# Patient Record
Sex: MALE | Race: WHITE | NOT HISPANIC OR LATINO | Employment: UNEMPLOYED | URBAN - METROPOLITAN AREA
[De-identification: names, ages, dates, MRNs, and addresses within clinical notes are randomized per-mention and may not be internally consistent; named-entity substitution may affect disease eponyms.]

---

## 2021-10-20 ENCOUNTER — HOSPITAL ENCOUNTER (EMERGENCY)
Facility: HOSPITAL | Age: 56
Discharge: ANOTHER HOSPITAL | End: 2021-10-20
Attending: EMERGENCY MEDICINE | Admitting: EMERGENCY MEDICINE

## 2021-10-20 ENCOUNTER — APPOINTMENT (OUTPATIENT)
Dept: CT IMAGING | Facility: HOSPITAL | Age: 56
End: 2021-10-20

## 2021-10-20 VITALS
OXYGEN SATURATION: 95 % | RESPIRATION RATE: 20 BRPM | HEART RATE: 87 BPM | DIASTOLIC BLOOD PRESSURE: 71 MMHG | SYSTOLIC BLOOD PRESSURE: 115 MMHG | BODY MASS INDEX: 19.64 KG/M2 | TEMPERATURE: 99.8 F | HEIGHT: 72 IN | WEIGHT: 145 LBS

## 2021-10-20 DIAGNOSIS — D72.829 LEUKOCYTOSIS, UNSPECIFIED TYPE: ICD-10-CM

## 2021-10-20 DIAGNOSIS — K65.1 PELVIC ABSCESS IN MALE (HCC): Primary | ICD-10-CM

## 2021-10-20 LAB
ALBUMIN SERPL-MCNC: 3.6 G/DL (ref 3.5–5.2)
ALBUMIN/GLOB SERPL: 0.9 G/DL
ALP SERPL-CCNC: 77 U/L (ref 39–117)
ALT SERPL W P-5'-P-CCNC: <5 U/L (ref 1–41)
ANION GAP SERPL CALCULATED.3IONS-SCNC: 10.7 MMOL/L (ref 5–15)
AST SERPL-CCNC: 9 U/L (ref 1–40)
BACTERIA UR QL AUTO: ABNORMAL /HPF
BASOPHILS # BLD AUTO: 0.08 10*3/MM3 (ref 0–0.2)
BASOPHILS NFR BLD AUTO: 0.5 % (ref 0–1.5)
BILIRUB SERPL-MCNC: <0.2 MG/DL (ref 0–1.2)
BILIRUB UR QL STRIP: NEGATIVE
BUN SERPL-MCNC: 10 MG/DL (ref 6–20)
BUN/CREAT SERPL: 22.2 (ref 7–25)
CALCIUM SPEC-SCNC: 8.9 MG/DL (ref 8.6–10.5)
CHLORIDE SERPL-SCNC: 96 MMOL/L (ref 98–107)
CLARITY UR: CLEAR
CO2 SERPL-SCNC: 28.3 MMOL/L (ref 22–29)
COLOR UR: YELLOW
CREAT SERPL-MCNC: 0.45 MG/DL (ref 0.76–1.27)
D-LACTATE SERPL-SCNC: 1.3 MMOL/L (ref 0.5–2)
DEPRECATED RDW RBC AUTO: 41.4 FL (ref 37–54)
EOSINOPHIL # BLD AUTO: 0.69 10*3/MM3 (ref 0–0.4)
EOSINOPHIL NFR BLD AUTO: 4.3 % (ref 0.3–6.2)
ERYTHROCYTE [DISTWIDTH] IN BLOOD BY AUTOMATED COUNT: 14.3 % (ref 12.3–15.4)
GFR SERPL CREATININE-BSD FRML MDRD: >150 ML/MIN/1.73
GLOBULIN UR ELPH-MCNC: 4.2 GM/DL
GLUCOSE SERPL-MCNC: 92 MG/DL (ref 65–99)
GLUCOSE UR STRIP-MCNC: NEGATIVE MG/DL
HCT VFR BLD AUTO: 30.2 % (ref 37.5–51)
HGB BLD-MCNC: 10.2 G/DL (ref 13–17.7)
HGB UR QL STRIP.AUTO: ABNORMAL
HOLD SPECIMEN: NORMAL
HOLD SPECIMEN: NORMAL
HYALINE CASTS UR QL AUTO: ABNORMAL /LPF
IMM GRANULOCYTES # BLD AUTO: 0.06 10*3/MM3 (ref 0–0.05)
IMM GRANULOCYTES NFR BLD AUTO: 0.4 % (ref 0–0.5)
KETONES UR QL STRIP: NEGATIVE
LEUKOCYTE ESTERASE UR QL STRIP.AUTO: NEGATIVE
LIPASE SERPL-CCNC: 27 U/L (ref 13–60)
LYMPHOCYTES # BLD AUTO: 1.93 10*3/MM3 (ref 0.7–3.1)
LYMPHOCYTES NFR BLD AUTO: 12.1 % (ref 19.6–45.3)
MCH RBC QN AUTO: 27.6 PG (ref 26.6–33)
MCHC RBC AUTO-ENTMCNC: 33.8 G/DL (ref 31.5–35.7)
MCV RBC AUTO: 81.6 FL (ref 79–97)
MONOCYTES # BLD AUTO: 0.81 10*3/MM3 (ref 0.1–0.9)
MONOCYTES NFR BLD AUTO: 5.1 % (ref 5–12)
NEUTROPHILS NFR BLD AUTO: 12.36 10*3/MM3 (ref 1.7–7)
NEUTROPHILS NFR BLD AUTO: 77.6 % (ref 42.7–76)
NITRITE UR QL STRIP: NEGATIVE
NRBC BLD AUTO-RTO: 0.1 /100 WBC (ref 0–0.2)
PH UR STRIP.AUTO: 5.5 [PH] (ref 5–8)
PLATELET # BLD AUTO: 350 10*3/MM3 (ref 140–450)
PMV BLD AUTO: 9 FL (ref 6–12)
POTASSIUM SERPL-SCNC: 4.3 MMOL/L (ref 3.5–5.2)
PROCALCITONIN SERPL-MCNC: 0.08 NG/ML (ref 0–0.25)
PROT SERPL-MCNC: 7.8 G/DL (ref 6–8.5)
PROT UR QL STRIP: ABNORMAL
RBC # BLD AUTO: 3.7 10*6/MM3 (ref 4.14–5.8)
RBC # UR: ABNORMAL /HPF
REF LAB TEST METHOD: ABNORMAL
SODIUM SERPL-SCNC: 135 MMOL/L (ref 136–145)
SP GR UR STRIP: 1.02 (ref 1–1.03)
SQUAMOUS #/AREA URNS HPF: ABNORMAL /HPF
UROBILINOGEN UR QL STRIP: ABNORMAL
WBC # BLD AUTO: 15.93 10*3/MM3 (ref 3.4–10.8)
WBC UR QL AUTO: ABNORMAL /HPF
WHOLE BLOOD HOLD SPECIMEN: NORMAL
WHOLE BLOOD HOLD SPECIMEN: NORMAL

## 2021-10-20 PROCEDURE — 96361 HYDRATE IV INFUSION ADD-ON: CPT

## 2021-10-20 PROCEDURE — 80053 COMPREHEN METABOLIC PANEL: CPT | Performed by: EMERGENCY MEDICINE

## 2021-10-20 PROCEDURE — 25010000002 IOPAMIDOL 61 % SOLUTION: Performed by: EMERGENCY MEDICINE

## 2021-10-20 PROCEDURE — 25010000002 MORPHINE PER 10 MG: Performed by: EMERGENCY MEDICINE

## 2021-10-20 PROCEDURE — 83690 ASSAY OF LIPASE: CPT | Performed by: EMERGENCY MEDICINE

## 2021-10-20 PROCEDURE — 85025 COMPLETE CBC W/AUTO DIFF WBC: CPT | Performed by: EMERGENCY MEDICINE

## 2021-10-20 PROCEDURE — 83605 ASSAY OF LACTIC ACID: CPT | Performed by: EMERGENCY MEDICINE

## 2021-10-20 PROCEDURE — 36415 COLL VENOUS BLD VENIPUNCTURE: CPT

## 2021-10-20 PROCEDURE — 96375 TX/PRO/DX INJ NEW DRUG ADDON: CPT

## 2021-10-20 PROCEDURE — 96365 THER/PROPH/DIAG IV INF INIT: CPT

## 2021-10-20 PROCEDURE — 84145 PROCALCITONIN (PCT): CPT | Performed by: EMERGENCY MEDICINE

## 2021-10-20 PROCEDURE — 87040 BLOOD CULTURE FOR BACTERIA: CPT | Performed by: EMERGENCY MEDICINE

## 2021-10-20 PROCEDURE — 74177 CT ABD & PELVIS W/CONTRAST: CPT

## 2021-10-20 PROCEDURE — 99284 EMERGENCY DEPT VISIT MOD MDM: CPT

## 2021-10-20 PROCEDURE — 81001 URINALYSIS AUTO W/SCOPE: CPT | Performed by: EMERGENCY MEDICINE

## 2021-10-20 PROCEDURE — 96376 TX/PRO/DX INJ SAME DRUG ADON: CPT

## 2021-10-20 PROCEDURE — 25010000002 PIPERACILLIN SOD-TAZOBACTAM PER 1 G: Performed by: EMERGENCY MEDICINE

## 2021-10-20 RX ORDER — MORPHINE SULFATE 2 MG/ML
2 INJECTION, SOLUTION INTRAMUSCULAR; INTRAVENOUS ONCE
Status: COMPLETED | OUTPATIENT
Start: 2021-10-20 | End: 2021-10-20

## 2021-10-20 RX ORDER — SODIUM CHLORIDE 0.9 % (FLUSH) 0.9 %
10 SYRINGE (ML) INJECTION AS NEEDED
Status: DISCONTINUED | OUTPATIENT
Start: 2021-10-20 | End: 2021-10-20 | Stop reason: HOSPADM

## 2021-10-20 RX ORDER — ACETAMINOPHEN 500 MG
1000 TABLET ORAL ONCE
Status: COMPLETED | OUTPATIENT
Start: 2021-10-20 | End: 2021-10-20

## 2021-10-20 RX ORDER — SODIUM CHLORIDE 9 MG/ML
125 INJECTION, SOLUTION INTRAVENOUS CONTINUOUS
Status: DISCONTINUED | OUTPATIENT
Start: 2021-10-20 | End: 2021-10-20 | Stop reason: HOSPADM

## 2021-10-20 RX ADMIN — SODIUM CHLORIDE 125 ML/HR: 9 INJECTION, SOLUTION INTRAVENOUS at 15:00

## 2021-10-20 RX ADMIN — MORPHINE SULFATE 2 MG: 2 INJECTION, SOLUTION INTRAMUSCULAR; INTRAVENOUS at 19:23

## 2021-10-20 RX ADMIN — MORPHINE SULFATE 2 MG: 2 INJECTION, SOLUTION INTRAMUSCULAR; INTRAVENOUS at 15:03

## 2021-10-20 RX ADMIN — IOPAMIDOL 85 ML: 612 INJECTION, SOLUTION INTRAVENOUS at 16:24

## 2021-10-20 RX ADMIN — ACETAMINOPHEN 1000 MG: 500 TABLET ORAL at 16:58

## 2021-10-20 RX ADMIN — SODIUM CHLORIDE 500 ML: 9 INJECTION, SOLUTION INTRAVENOUS at 17:01

## 2021-10-20 RX ADMIN — TAZOBACTAM SODIUM AND PIPERACILLIN SODIUM 3.38 G: 375; 3 INJECTION, SOLUTION INTRAVENOUS at 18:07

## 2021-10-20 NOTE — ED PROVIDER NOTES
EMERGENCY DEPARTMENT ENCOUNTER    CHIEF COMPLAINT  Chief Complaint: Abdominal pain  History given by: Patient  History limited by: Nothing  Room Number: 22/22  PMD: Provider, No Known  Infectious disease physician: Dr. Amparo Gan    HPI:  Pt is a 56 y.o. male presents complaining of worsening lower abdominal pain over the past week since removal of his abdominal/pelvic drains.  Patient reports pain is worse with bowel movements and movement, denies chest pain, shortness of air, cough, fever but does report night sweats.  Patient denies nausea/vomiting, reports no changes in his urinary or bowel habits.  Patient reports he has a right arm PICC through which he has been receiving antibiotics regularly and denies any tenderness swelling or drainage from the site.  Patient reports he was initially admitted for a month with pelvic fracture after falling off a truck with ORIF of pelvis and reports that he subsequently developed infection in his pelvis and had multiple drains placed was again admitted at [facility] and discharged back to [facility] on IV antibiotics      Duration: 1 week  Associated Symptoms: Night sweats  Aggravating Factors: Anterior abdominal pain worse with bowel movements, palpation  Alleviating Factors: Nothing  Treatment before arrival: Regular meds    Upon review the patient's chart it is noted:  Patient with ORIF of left pelvis with fixation of left acetabulum with involvement of roof, medial, posterior walls, acute mildly comminuted fracture of the left iliac wing, acute fractures of the left superior and inferior pubic rami with displaced fracture fragment medially, ill-defined hemorrhage noted within the fracture sites with extraperitoneal hemorrhage in the pelvis, no acute fracture or dislocation of the left femur involving acetabulum, superior and inferior pubic rami, ultrasound testicles 9/10/2021 with no evidence of testicular torsion  Patient with negative CT head, CT  C-spine, CT L-spine, plain films of left knee and left femur 8/5/2021    Discharge 9/23/2021 on 6 weeks of antibiotics with rifampin and Kefzol every 8 hours until 10/28/2021 after admit with fever, 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    FAMILY HISTORY  No family history on file.    SOCIAL HISTORY  Social History     Socioeconomic History   • Marital status: Single       ALLERGIES  Hydrocodone    REVIEW OF SYSTEMS  Review of Systems   Constitutional: Positive for appetite change and chills. Negative for fever.   HENT: Negative for sore throat and trouble swallowing.    Eyes: Negative for visual disturbance.   Respiratory: Negative for cough and shortness of breath.    Cardiovascular: Negative for chest pain and leg swelling.   Gastrointestinal: Positive for abdominal pain. Negative for diarrhea and vomiting.   Endocrine: Negative.    Genitourinary: Negative for decreased urine volume and frequency.   Musculoskeletal: Negative for neck pain.   Skin: Negative for rash.   Allergic/Immunologic: Negative.    Neurological: Negative for weakness and numbness.   Hematological: Negative.    Psychiatric/Behavioral: Negative.    All other systems reviewed and are negative.      PHYSICAL EXAM  ED Triage Vitals [10/20/21 1232]   Temp Heart Rate Resp BP SpO2   98.9 °F (37.2 °C) 80 18 130/80 98 %      Temp src Heart Rate Source Patient Position BP Location FiO2 (%)   Temporal Monitor -- -- --       Physical Exam  Vitals and nursing note reviewed.   Constitutional:       General: He is in acute  distress.   HENT:      Head: Normocephalic and atraumatic.   Cardiovascular:      Rate and Rhythm: Normal rate and regular rhythm.      Pulses:           Posterior tibial pulses are 2+ on the right side and 2+ on the left side.      Heart sounds: Normal heart sounds. No murmur heard.      Pulmonary:      Effort: Pulmonary effort is normal. No respiratory distress.      Breath sounds: Normal breath sounds. No wheezing.   Abdominal:      General: Bowel sounds are normal.      Palpations: Abdomen is soft.      Tenderness: There is abdominal tenderness in the periumbilical area, suprapubic area and left lower quadrant. There is guarding. There is no rebound.   Musculoskeletal:         General: Normal range of motion.      Cervical back: Normal range of motion.   Skin:     General: Skin is warm and dry.   Neurological:      Mental Status: He is alert and oriented to person, place, and time.   Psychiatric:         Mood and Affect: Affect normal.         LAB RESULTS  Lab Results (last 24 hours)     Procedure Component Value Units Date/Time    Urinalysis With Microscopic If Indicated (No Culture) - Urine, Clean Catch [[id number]]  (Abnormal) Collected: 10/20/21 1421    Specimen: Urine, Clean Catch Updated: 10/20/21 1529     Color, UA Yellow     Appearance, UA Clear     pH, UA 5.5     Specific Gravity, UA 1.020     Glucose, UA Negative     Ketones, UA Negative     Bilirubin, UA Negative     Blood, UA Trace     Protein, UA Trace     Leuk Esterase, UA Negative     Nitrite, UA Negative     Urobilinogen, UA 0.2 E.U./dL    Urinalysis, Microscopic Only - Urine, Clean Catch [[id number]]  (Abnormal) Collected: 10/20/21 1421    Specimen: Urine, Clean Catch Updated: 10/20/21 1529     RBC, UA 6-12 /HPF      WBC, UA 0-2 /HPF      Bacteria, UA None Seen /HPF      Squamous Epithelial Cells, UA 0-2 /HPF      Hyaline Casts, UA 0-2 /LPF      Methodology Automated Microscopy    CBC & Differential [[id number]]  (Abnormal) Collected: 10/20/21 1458     Specimen: Blood Updated: 10/20/21 1524    Narrative:      The following orders were created for panel order CBC & Differential.  Procedure                               Abnormality         Status                     ---------                               -----------         ------                     CBC Auto Differential[[id number]]        Abnormal            Final result                 Please view results for these tests on the individual orders.    Comprehensive Metabolic Panel [[id number]]  (Abnormal) Collected: 10/20/21 1458    Specimen: Blood Updated: 10/20/21 1550     Glucose 92 mg/dL      BUN 10 mg/dL      Creatinine 0.45 mg/dL      Sodium 135 mmol/L      Potassium 4.3 mmol/L      Chloride 96 mmol/L      CO2 28.3 mmol/L      Calcium 8.9 mg/dL      Total Protein 7.8 g/dL      Albumin 3.60 g/dL      ALT (SGPT) <5 U/L      AST (SGOT) 9 U/L      Alkaline Phosphatase 77 U/L      Total Bilirubin <0.2 mg/dL      eGFR Non African Amer >150 mL/min/1.73      Globulin 4.2 gm/dL      A/G Ratio 0.9 g/dL      BUN/Creatinine Ratio 22.2     Anion Gap 10.7 mmol/L     Narrative:      GFR Normal >60  Chronic Kidney Disease <60  Kidney Failure <15      Lipase [[id number]]  (Normal) Collected: 10/20/21 1458    Specimen: Blood Updated: 10/20/21 1550     Lipase 27 U/L     CBC Auto Differential [[id number]]  (Abnormal) Collected: 10/20/21 1458    Specimen: Blood Updated: 10/20/21 1524     WBC 15.93 10*3/mm3      RBC 3.70 10*6/mm3      Hemoglobin 10.2 g/dL      Hematocrit 30.2 %      MCV 81.6 fL      MCH 27.6 pg      MCHC 33.8 g/dL      RDW 14.3 %      RDW-SD 41.4 fl      MPV 9.0 fL      Platelets 350 10*3/mm3      Neutrophil % 77.6 %      Lymphocyte % 12.1 %      Monocyte % 5.1 %      Eosinophil % 4.3 %      Basophil % 0.5 %      Immature Grans % 0.4 %      Neutrophils, Absolute 12.36 10*3/mm3      Lymphocytes, Absolute 1.93 10*3/mm3      Monocytes, Absolute 0.81 10*3/mm3      Eosinophils, Absolute 0.69 10*3/mm3      Basophils,  "Absolute 0.08 10*3/mm3      Immature Grans, Absolute 0.06 10*3/mm3      nRBC 0.1 /100 WBC     Procalcitonin [[id number]]  (Normal) Collected: 10/20/21 1458    Specimen: Blood Updated: 10/20/21 1655     Procalcitonin 0.08 ng/mL     Narrative:      As a Marker for Sepsis (Non-Neonates):     1. <0.5 ng/mL represents a low risk of severe sepsis and/or septic shock.  2. >2 ng/mL represents a high risk of severe sepsis and/or septic shock.    As a Marker for Lower Respiratory Tract Infections that require antibiotic therapy:  PCT on Admission     Antibiotic Therapy             6-12 Hrs later  >0.5                          Strongly Recommended            >0.25 - <0.5             Recommended  0.1 - 0.25                  Discouraged                       Remeasure/reassess PCT  <0.1                         Strongly Discouraged         Remeasure/reassess PCT      As 28 day mortality risk marker: \"Change in Procalcitonin Result\" (>80% or <=80%) if Day 0 (or Day 1) and Day 4 values are available. Refer to http://www.[employer] CorpList of [place] hospitals according to the [facility]-pct-calculator.com/    Change in PCT <=80 %   A decrease of PCT levels below or equal to 80% defines a positive change in PCT test result representing a higher risk for 28-day all-cause mortality of patients diagnosed with severe sepsis or septic shock.    Change in PCT >80 %   A decrease of PCT levels of more than 80% defines a negative change in PCT result representing a lower risk for 28-day all-cause mortality of patients diagnosed with severe sepsis or septic shock.                Lactic Acid, Plasma [[id number]]  (Normal) Collected: 10/20/21 1702    Specimen: Blood Updated: 10/20/21 1748     Lactate 1.3 mmol/L     Blood Culture - Blood, Arm, Left [[id number]] Collected: 10/20/21 1702    Specimen: Blood from Arm, Left Updated: 10/20/21 1722    Blood Culture - Blood, Arm, Left [[id number]] Collected: 10/20/21 1802    Specimen: Blood from Arm, Left Updated: 10/20/21 1918          I ordered the above labs and " reviewed the results    RADIOLOGY  CT Abdomen Pelvis With Contrast   Final Result       The bowel loops are somewhat difficult to distinguish without enteric   contrast material, but there appears to be an extraluminal collection of   gas and debris anterior to the rectum; if necessary, follow-up CT with   enteric contrast material, including rectal contrast material may better   characterize this collection in relation to surrounding bowel. Mildly   prominent para-aortic lymph nodes, nonspecific.       Nonspecific wall thickening in the rectum and urinary bladder.           Discussed by telephone with Dr. Callaway at 1702, 10/20/2021.       This report was finalized on 10/20/2021 5:10 PM by Dr. Jaime Medina M.D.               I ordered the above noted radiological studies. Interpreted by radiologist. Viewed by me in PACS.       PROCEDURES  Procedures      PROGRESS AND CONSULTS  ED Course as of 10/20/21 2026   Wed Oct 20, 2021   1606 eGFR Non  Am: >150 [TO]   1748 Discussed with Dr. Medina, radiology who reports patient has an area of gas and debris outside the bowel in his pelvis 6 cm in greatest dimension with adjacent sigmoid colon bladder thickening which could be indicative of cystitis/colitis [TO]   1800 Ignrid with Dr. Doshi, on-call for general surgery who is aware of patient's presentation, imaging, labs and history and reports given his recent extensive surgeries at [facility] the patient would best served by continuity of care at that facility [TO]   1805 Discussed with Dr. Hudson, attending physician [facility] emergency department who is aware of patient's recent 2 admissions to [facility], seen by infectious disease, orthopedic surgery and other specialists at that facility, will review chart and call us back [TO]   1838 Transfer center called and reports Dr. Terese Hudson is accepting the patient in transfer to [facility]  emergency department for admission for further testing, treatment as needed. [TO]   1919 Discussed with patient who voices understanding of signs of abscess in his pelvis persistent, fever, agrees to transfer.  Requests more pain meds in the ER today [TO]      ED Course User Index  [TO] Cele Callaway MD           MEDICAL DECISION MAKING  Results were reviewed/discussed with the patient and they were also made aware of online access. Pt also made aware that some labs, such as cultures, will not be resulted during ER visit and follow up with PMD is necessary.       MDM       DIAGNOSIS  Final diagnoses:   Pelvic abscess in male (HCC)   Leukocytosis, unspecified type       DISPOSITION  Transfer to [facility] for definitive care    Latest Documented Vital Signs:  As of 20:26 EDT  BP- 115/71 HR- 87 Temp- 99.8 °F (37.7 °C) (Oral) O2 sat- 95%    --  Patient was wearing facemask when I entered the room and throughout our encounter. Full protective equipment was worn throughout this patient encounter including a face mask, eye protection and gloves. Hand hygiene was performed before donning protective equipment and after removal when leaving the room.             Cele Callaway MD  10/20/21 2026

## 2021-10-20 NOTE — ED TRIAGE NOTES
Pt was brought in by ems from [facility] for lower abd pain with elevated wbc. Denies fever    Pt wearing mask on arrival. Staff wearing mask and goggles at time of triage.

## 2021-10-20 NOTE — CASE MANAGEMENT/SOCIAL WORK
Contacted [facility] to arrange ambulance transport to  for EMTALA transfer (continuity of care). Awaiting return call with ETA. Radiology notified of need for disc and they state they will deliver it to the bedside. LALO LockhartN RN

## 2021-10-25 LAB
BACTERIA SPEC AEROBE CULT: NORMAL
BACTERIA SPEC AEROBE CULT: NORMAL